# Patient Record
Sex: FEMALE | Race: WHITE | NOT HISPANIC OR LATINO | Employment: UNEMPLOYED | URBAN - METROPOLITAN AREA
[De-identification: names, ages, dates, MRNs, and addresses within clinical notes are randomized per-mention and may not be internally consistent; named-entity substitution may affect disease eponyms.]

---

## 2017-02-14 ENCOUNTER — HOSPITAL ENCOUNTER (OUTPATIENT)
Dept: RADIOLOGY | Facility: MEDICAL CENTER | Age: 42
End: 2017-02-14

## 2019-12-10 ENCOUNTER — OFFICE VISIT (OUTPATIENT)
Dept: NEUROLOGY | Facility: MEDICAL CENTER | Age: 44
End: 2019-12-10
Payer: MEDICAID

## 2019-12-10 VITALS
BODY MASS INDEX: 29.16 KG/M2 | TEMPERATURE: 97.9 F | OXYGEN SATURATION: 98 % | DIASTOLIC BLOOD PRESSURE: 70 MMHG | WEIGHT: 175 LBS | SYSTOLIC BLOOD PRESSURE: 132 MMHG | HEIGHT: 65 IN | HEART RATE: 62 BPM

## 2019-12-10 DIAGNOSIS — R51.9 WORSENING HEADACHES: ICD-10-CM

## 2019-12-10 DIAGNOSIS — G43.019 INTRACTABLE MIGRAINE WITHOUT AURA AND WITHOUT STATUS MIGRAINOSUS: ICD-10-CM

## 2019-12-10 PROCEDURE — 99205 OFFICE O/P NEW HI 60 MIN: CPT

## 2019-12-10 RX ORDER — VALPROIC ACID 250 MG/1
250 CAPSULE, LIQUID FILLED ORAL DAILY
COMMUNITY
End: 2020-07-29

## 2019-12-10 RX ORDER — FLUTICASONE PROPIONATE 50 MCG
SPRAY, SUSPENSION (ML) NASAL
Refills: 0 | COMMUNITY
Start: 2019-12-01 | End: 2020-07-29 | Stop reason: SDUPTHER

## 2019-12-10 RX ORDER — NORTRIPTYLINE HYDROCHLORIDE 25 MG/1
25 CAPSULE ORAL EVERY EVENING
Qty: 90 CAP | Refills: 3 | Status: SHIPPED | OUTPATIENT
Start: 2019-12-10 | End: 2020-05-26

## 2019-12-10 RX ORDER — MONTELUKAST SODIUM 10 MG/1
TABLET ORAL
Refills: 10 | COMMUNITY
Start: 2019-11-06 | End: 2020-07-29 | Stop reason: SDUPTHER

## 2019-12-10 RX ORDER — OXYBUTYNIN CHLORIDE 10 MG/1
TABLET, EXTENDED RELEASE ORAL
Refills: 1 | COMMUNITY
Start: 2019-11-06 | End: 2020-07-29 | Stop reason: SDUPTHER

## 2019-12-10 RX ORDER — RIZATRIPTAN BENZOATE 5 MG/1
1 TABLET, ORALLY DISINTEGRATING ORAL
Qty: 10 TAB | Refills: 3 | Status: SHIPPED | OUTPATIENT
Start: 2019-12-10 | End: 2020-07-29 | Stop reason: SDUPTHER

## 2019-12-10 RX ORDER — POLYETHYLENE GLYCOL 3350 17 G/17G
POWDER, FOR SOLUTION ORAL
Refills: 11 | COMMUNITY
Start: 2019-11-23 | End: 2020-07-29 | Stop reason: SDUPTHER

## 2019-12-10 RX ORDER — METHYLPREDNISOLONE 4 MG/1
4 TABLET ORAL DAILY
Qty: 21 TAB | Refills: 0 | Status: SHIPPED | OUTPATIENT
Start: 2019-12-10 | End: 2020-07-29

## 2019-12-10 RX ORDER — RIZATRIPTAN BENZOATE 5 MG/1
1 TABLET, ORALLY DISINTEGRATING ORAL
Qty: 10 TAB | Refills: 3 | Status: SHIPPED | OUTPATIENT
Start: 2019-12-10 | End: 2019-12-10

## 2019-12-10 RX ORDER — NORTRIPTYLINE HYDROCHLORIDE 25 MG/1
25 CAPSULE ORAL EVERY EVENING
Qty: 90 CAP | Refills: 3 | Status: SHIPPED | OUTPATIENT
Start: 2019-12-10 | End: 2019-12-10

## 2019-12-10 RX ORDER — BACLOFEN 20 MG
500 TABLET ORAL DAILY
Qty: 90 TAB | Refills: 3 | Status: SHIPPED | OUTPATIENT
Start: 2019-12-10 | End: 2020-07-29 | Stop reason: SDUPTHER

## 2019-12-10 RX ORDER — FERROUS SULFATE 325(65) MG
325 TABLET ORAL DAILY
Refills: 3 | COMMUNITY
Start: 2019-10-17 | End: 2020-07-29 | Stop reason: SDUPTHER

## 2019-12-10 ASSESSMENT — ENCOUNTER SYMPTOMS
INSOMNIA: 1
HEADACHES: 1
PHOTOPHOBIA: 1
RESPIRATORY NEGATIVE: 1
NECK PAIN: 1
GASTROINTESTINAL NEGATIVE: 1
CARDIOVASCULAR NEGATIVE: 1

## 2019-12-10 NOTE — PROGRESS NOTES
CC Migraines    HPI  45 yo female with longstanding migraines presents with worsening of her headaches since the move to [place] from [facility] in 2000. Took a medication but forgot what she was taking but this had helped her back then. Photophobia, squeezng and pressure and throbbing quality in both temples radiating back and behind her eyes.She has extreme phonophobia when this happens and she also gets nausea occasionally but no vomiting.  Dark rook and cool temperatures do help her headaches.  CT head in 2013 was reported normal.  Her PCP placed her on  mg bid and this is not working.  In the past month she had headache all day every day, she wakes up with them sometimes they wake her up in the middle of the night. Noise provokes the headaches.  She has headaches all day every day. She takes Naproxen 500 mg three times per week and that helps a little bit.  Intensity is 7/10 and it does not get any lower.  She has been on Depakote 1-1.5 year ago.  She does not sleep well and has trouble falling asleep.  She does not work and is on disability because of her migraines and also venous status ulcers.  She is not diabetic.  Family history of migraines in her mom.    Review of Systems   Constitutional: Positive for malaise/fatigue.   HENT: Negative.    Eyes: Positive for photophobia.   Respiratory: Negative.    Cardiovascular: Negative.    Gastrointestinal: Negative.    Genitourinary: Negative.    Musculoskeletal: Positive for neck pain.   Skin: Negative.    Neurological: Positive for headaches.   Psychiatric/Behavioral: The patient has insomnia.      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 at SURGERY [facility]   • EXPLORATORY LAPAROTOMY  6/20/2013    Performed by Klever Estrada M.D. at SURGERY [facility]   • CHOLECYSTECTOMY       Family History   Problem Relation Age of Onset   • Hypertension Mother    • Anemia Mother    • Diabetes Mother    • Hypertension Father      Social History     Socioeconomic History   • Marital status:      Spouse name: Not on file   • Number of children: Not on file   • Years of education: Not on file   • Highest education level: Not on file   Occupational History   • Not on file   Social Needs   • Financial resource strain: Not on file   • Food insecurity:     Worry: Not on file     Inability: Not on file   • Transportation needs:     Medical: Not on file     Non-medical: Not on file   Tobacco Use   • Smoking status: Never Smoker   • Smokeless tobacco: Never Used   Substance and Sexual Activity   • Alcohol use: Yes     Alcohol/week: 2.5 oz     Types: 5 Cans of beer per week     Comment: 2-3 beers couple times a week   • Drug use: No     Comment: denies h/o heroin, cocaine, meth, IVDU   • Sexual activity: Yes     Partners: Male     Comment:    Lifestyle   • Physical activity:     Days per week: Not on file     Minutes per session: Not on file   • Stress: Not on file   Relationships   • Social connections:     Talks on phone: Not on file     Gets together: Not on file     Attends Taoism service: Not on file     Active member of club or organization: Not on file     Attends meetings of clubs or organizations: Not on file     Relationship status: Not on file   • Intimate partner violence:     Fear of current or ex partner: Not on file     Emotionally abused: Not on file     Physically abused: Not on file     Forced sexual activity: Not on file   Other Topics Concern   • Not on file   Social History Narrative    She lives at home with her , no children     Allergies   Allergen Reactions   • Asa [Aspirin] Anaphylaxis, Diarrhea  "and Vomiting   • Codeine Anaphylaxis, Diarrhea and Vomiting   • Pcn [Penicillins] Anaphylaxis, Diarrhea and Vomiting   • Vicodin [Hydrocodone-Acetaminophen]        Encounter Vitals  Standard Vitals  Vitals  Blood Pressure: 132/70  Temperature: 36.6 °C (97.9 °F)  Pulse: 62  Pulse Oximetry: 98 %  Height: 165.1 cm (5' 5\")  Weight: 79.4 kg (175 lb)  Encounter Vitals  Temperature: 36.6 °C (97.9 °F)  Blood Pressure: 132/70  Pulse: 62  Pulse Oximetry: 98 %  Weight: 79.4 kg (175 lb)  Height: 165.1 cm (5' 5\")  BMI (Calculated): 29.12  Physical exam   Constitutional: Well-developed, well-nourished, good hygiene. Appears stated age.  Cardiovascular: RRR, with no murmurs, rubs or gallops. No carotid bruits.   Respiratory: Lungs CTA B/L, no W/R/R.   Abdomen: Soft Non-tender to Palpation. Non-distended.  Extremities: No peripheral edema, pedal pulses intact.   Skin: Warm, dry, intact. No rashes observed.  Eyes: Sclera anicteric   Funduscopic: Optic discs flat with no evidence of papilledema or pallor.   Neurologic:   Mental Status: Awake, alert, oriented x 3.   Speech: Fluent with normal prosody.   Memory: Able to recall recent and remote events accurately.    Concentration: Attentive. Able to focus on history and follow multi-step commands.              Fund of Knowledge: Appropriate   Cranial Nerves:    CN II: PERRL. No afferent pupillary defect.    CN III, IV, VI: Good eye closure, EOMI.     CN V: Facial sensation intact and symmetric.     CN VII: No facial asymmetry.     CN VIII: Hearing intact.     CN IX and X: Palate elevates symmetrically. Normal gag reflex.    CN XI: Symmetric shoulder shrug.     CN XII: Tongue midline.    Sensory: Intact light touch, vibration and temperature.    Coordination: No evidence of past-pointing on finger to nose testing, no dysdiadochokinesia. Heel to shin intact.             DTR's: 2+ throughout without clonus.    Babinski: Toes downgoing bilaterally.   Romberg: Negative.   Movements: No " tremors observed.   Musculoskeletal:    Strength: 5/5 in upper and lower extremities bilaterally.   Gait: Steady, narrow based.    Tone: Normal bulk and tone.   Joints: No swelling.     Current Outpatient Medications on File Prior to Visit   Medication Sig Dispense Refill   • valproic acid (DEPAKENE) 250 MG Cap Take 250 mg by mouth 2 Times a Day. Indications: Migraine Headache     • FEROSUL 325 (65 Fe) MG tablet Take 325 mg by mouth every day.  3   • fluticasone (FLONASE) 50 MCG/ACT nasal spray INSTILL ONE SPRAY IN EACH NOSTRIL BID  0   • montelukast (SINGULAIR) 10 MG Tab TK 1 T PO QAM  10   • oxybutynin SR (DITROPAN-XL) 10 MG CR tablet TK 1 T PO QD  1   • polyethylene glycol/lytes (MIRALAX) Pack   11   • Cetirizine HCl 10 MG Cap Take  by mouth.     • albuterol (VENTOLIN OR PROVENTIL) 108 (90 BASE) MCG/ACT AERS Inhale 2-4 Puffs by mouth every 8 hours as needed. For SOB.       No current facility-administered medications on file prior to visit.      Lab Results   Component Value Date/Time    SODIUM 137 02/15/2014 11:00 AM    POTASSIUM 4.3 02/15/2014 11:00 AM    CHLORIDE 106 02/15/2014 11:00 AM    CO2 24 02/15/2014 11:00 AM    GLUCOSE 98 02/15/2014 11:00 AM    BUN 9 02/15/2014 11:00 AM    CREATININE 0.74 02/15/2014 11:00 AM      Lab Results   Component Value Date/Time    WBC 8.2 02/15/2014 11:00 AM    RBC 4.43 02/15/2014 11:00 AM    HEMOGLOBIN 13.5 02/15/2014 11:00 AM    HEMATOCRIT 40.8 02/15/2014 11:00 AM    MCV 92.1 02/15/2014 11:00 AM    MCH 30.5 02/15/2014 11:00 AM    MCHC 33.1 02/15/2014 11:00 AM    MPV 9.5 02/15/2014 11:00 AM    NEUTSPOLYS 68.4 02/15/2014 11:00 AM    LYMPHOCYTES 18.0 (L) 02/15/2014 11:00 AM    MONOCYTES 6.4 02/15/2014 11:00 AM    EOSINOPHILS 6.4 (H) 02/15/2014 11:00 AM    BASOPHILS 0.8 02/15/2014 11:00 AM    HYPOCHROMIA 1+ 07/07/2013 02:32 PM      Current Outpatient Medications on File Prior to Visit   Medication Sig Dispense Refill   • valproic acid (DEPAKENE) 250 MG Cap Take 250 mg by mouth 2  Times a Day. Indications: Migraine Headache     • FEROSUL 325 (65 Fe) MG tablet Take 325 mg by mouth every day.  3   • fluticasone (FLONASE) 50 MCG/ACT nasal spray INSTILL ONE SPRAY IN EACH NOSTRIL BID  0   • montelukast (SINGULAIR) 10 MG Tab TK 1 T PO QAM  10   • oxybutynin SR (DITROPAN-XL) 10 MG CR tablet TK 1 T PO QD  1   • polyethylene glycol/lytes (MIRALAX) Pack   11   • Cetirizine HCl 10 MG Cap Take  by mouth.     • albuterol (VENTOLIN OR PROVENTIL) 108 (90 BASE) MCG/ACT AERS Inhale 2-4 Puffs by mouth every 8 hours as needed. For SOB.       No current facility-administered medications on file prior to visit.      Imaging   CT head from 2013   NAF    No imaging since     Impression and plan   Migraine headaches without aura   Pamelor 25 mg nightly x 7 days then increase to 50 mg nightly   Stop VPA- decrease to 250 mg daily for 5-6 days then stop   Will give Maxalt 5 mg desintegrating tabs for migraine prn up to 10 tabs per month and no more than 2 in 24h  Mag oxide 500 mg daily  B2 400 mg daily  Coenzyme Q10 300 mg daily   MRI brain with and without contrast    ifestyle changes that will help to reduce headache frequency and intensity    Diet: Eat at regular times each day. Eat protein throughout the day. It is especially important to start the day with protein.  Sleep: Keep regular hours for going to bed at night and getting up in the morning. Do not vary the hours on the weekend. Do not watch TV and read in bed. If you are unable to sleep, get up and read something boring until you feel sleepy, then go back to bed. If you are not able to sleep within 20 minutes, get up again. Even if you do not sleep well, get out of bed at the same time in the morning. Do not take naps. This will help you to regulate your sleep, and improve your sleep quality.   Exercise: The goal is to exercise at the same time every day for at least 20 minutes at an intensity that increases your heart rate.    3. Headache diary- fatuma on a  calendar the days you have a headache, severity of headache, medication taken, and how long headache lasted    RTC 3-4 months     Caren Burr MD PhD

## 2020-01-13 ENCOUNTER — APPOINTMENT (OUTPATIENT)
Dept: RADIOLOGY | Facility: MEDICAL CENTER | Age: 45
End: 2020-01-13
Payer: MEDICAID

## 2020-01-13 DIAGNOSIS — R51.9 WORSENING HEADACHES: ICD-10-CM

## 2020-01-13 DIAGNOSIS — G43.019 INTRACTABLE MIGRAINE WITHOUT AURA AND WITHOUT STATUS MIGRAINOSUS: ICD-10-CM

## 2020-01-13 PROCEDURE — 70553 MRI BRAIN STEM W/O & W/DYE: CPT

## 2020-01-13 PROCEDURE — A9576 INJ PROHANCE MULTIPACK: HCPCS | Performed by: INTERNAL MEDICINE

## 2020-01-13 PROCEDURE — 700117 HCHG RX CONTRAST REV CODE 255: Performed by: INTERNAL MEDICINE

## 2020-01-13 RX ADMIN — GADOTERIDOL 15 ML: 279.3 INJECTION, SOLUTION INTRAVENOUS at 14:39

## 2020-05-26 RX ORDER — NORTRIPTYLINE HYDROCHLORIDE 25 MG/1
CAPSULE ORAL
Qty: 90 CAP | Refills: 3 | Status: SHIPPED | OUTPATIENT
Start: 2020-05-26 | End: 2020-12-10 | Stop reason: SDUPTHER

## 2020-07-29 ENCOUNTER — OFFICE VISIT (OUTPATIENT)
Dept: MEDICAL GROUP | Facility: CLINIC | Age: 45
End: 2020-07-29
Payer: MEDICAID

## 2020-07-29 VITALS
HEART RATE: 93 BPM | WEIGHT: 168 LBS | TEMPERATURE: 99.8 F | HEIGHT: 65 IN | BODY MASS INDEX: 27.99 KG/M2 | SYSTOLIC BLOOD PRESSURE: 112 MMHG | OXYGEN SATURATION: 98 % | RESPIRATION RATE: 16 BRPM | DIASTOLIC BLOOD PRESSURE: 72 MMHG

## 2020-07-29 DIAGNOSIS — L98.9 LESION OF FACE: ICD-10-CM

## 2020-07-29 DIAGNOSIS — N30.90 CYSTITIS: ICD-10-CM

## 2020-07-29 DIAGNOSIS — E87.6 HYPOKALEMIA: ICD-10-CM

## 2020-07-29 DIAGNOSIS — K59.01 SLOW TRANSIT CONSTIPATION: ICD-10-CM

## 2020-07-29 DIAGNOSIS — D23.30 CYST, DERMOID, FACE: ICD-10-CM

## 2020-07-29 DIAGNOSIS — J45.40 MODERATE PERSISTENT ASTHMA WITHOUT COMPLICATION: ICD-10-CM

## 2020-07-29 DIAGNOSIS — J30.2 SEASONAL ALLERGIES: ICD-10-CM

## 2020-07-29 DIAGNOSIS — E55.9 VITAMIN D DEFICIENCY: ICD-10-CM

## 2020-07-29 DIAGNOSIS — R32 URINARY INCONTINENCE, UNSPECIFIED TYPE: ICD-10-CM

## 2020-07-29 DIAGNOSIS — G25.81 RESTLESS LEG: ICD-10-CM

## 2020-07-29 DIAGNOSIS — G43.119 INTRACTABLE MIGRAINE WITH AURA WITHOUT STATUS MIGRAINOSUS: ICD-10-CM

## 2020-07-29 PROCEDURE — 99204 OFFICE O/P NEW MOD 45 MIN: CPT | Performed by: PHYSICIAN ASSISTANT

## 2020-07-29 RX ORDER — MOMETASONE FUROATE AND FORMOTEROL FUMARATE DIHYDRATE 100; 5 UG/1; UG/1
2 AEROSOL RESPIRATORY (INHALATION) 2 TIMES DAILY
Qty: 3 G | Refills: 3 | Status: SHIPPED | OUTPATIENT
Start: 2020-07-29

## 2020-07-29 RX ORDER — FEXOFENADINE HCL 180 MG/1
180 TABLET ORAL DAILY
Qty: 90 TAB | Refills: 1 | Status: SHIPPED | OUTPATIENT
Start: 2020-07-29

## 2020-07-29 RX ORDER — MONTELUKAST SODIUM 10 MG/1
10 TABLET ORAL DAILY
Qty: 90 TAB | Refills: 2 | Status: SHIPPED | OUTPATIENT
Start: 2020-07-29

## 2020-07-29 RX ORDER — BACLOFEN 20 MG
500 TABLET ORAL DAILY
Qty: 90 TAB | Refills: 3 | Status: SHIPPED | OUTPATIENT
Start: 2020-07-29

## 2020-07-29 RX ORDER — POTASSIUM CHLORIDE 750 MG/1
TABLET, EXTENDED RELEASE ORAL
COMMUNITY
Start: 2020-07-14 | End: 2020-07-29 | Stop reason: SDUPTHER

## 2020-07-29 RX ORDER — NAPROXEN 500 MG/1
500 TABLET ORAL 2 TIMES DAILY WITH MEALS
Qty: 180 TAB | Refills: 2 | Status: SHIPPED | OUTPATIENT
Start: 2020-07-29 | End: 2020-12-14 | Stop reason: SDUPTHER

## 2020-07-29 RX ORDER — OXYBUTYNIN CHLORIDE 10 MG/1
10 TABLET, EXTENDED RELEASE ORAL DAILY
Qty: 90 TAB | Refills: 2 | Status: SHIPPED | OUTPATIENT
Start: 2020-07-29 | End: 2020-12-14 | Stop reason: SDUPTHER

## 2020-07-29 RX ORDER — FLUTICASONE PROPIONATE 50 MCG
2 SPRAY, SUSPENSION (ML) NASAL DAILY
Qty: 16 G | Refills: 6 | Status: SHIPPED | OUTPATIENT
Start: 2020-07-29 | End: 2020-12-14 | Stop reason: SDUPTHER

## 2020-07-29 RX ORDER — POTASSIUM CHLORIDE 1.5 G/1.58G
20 POWDER, FOR SOLUTION ORAL 2 TIMES DAILY
COMMUNITY
End: 2020-07-29

## 2020-07-29 RX ORDER — POTASSIUM CHLORIDE 750 MG/1
10 TABLET, EXTENDED RELEASE ORAL DAILY
Qty: 90 TAB | Refills: 2 | Status: SHIPPED | OUTPATIENT
Start: 2020-07-29 | End: 2020-12-14 | Stop reason: SDUPTHER

## 2020-07-29 RX ORDER — ALBUTEROL SULFATE 90 UG/1
2-4 AEROSOL, METERED RESPIRATORY (INHALATION) EVERY 8 HOURS PRN
Qty: 8.5 G | Refills: 3 | Status: SHIPPED | OUTPATIENT
Start: 2020-07-29 | End: 2020-12-14 | Stop reason: SDUPTHER

## 2020-07-29 RX ORDER — RIZATRIPTAN BENZOATE 5 MG/1
1 TABLET, ORALLY DISINTEGRATING ORAL
Qty: 10 TAB | Refills: 3 | Status: SHIPPED | OUTPATIENT
Start: 2020-07-29 | End: 2020-12-14 | Stop reason: SDUPTHER

## 2020-07-29 RX ORDER — POLYETHYLENE GLYCOL 3350 17 G/17G
17 POWDER, FOR SOLUTION ORAL DAILY
Qty: 90 EACH | Refills: 3 | Status: SHIPPED | OUTPATIENT
Start: 2020-07-29

## 2020-07-29 RX ORDER — FERROUS SULFATE 325(65) MG
325 TABLET ORAL DAILY
Qty: 90 TAB | Refills: 3 | Status: SHIPPED | OUTPATIENT
Start: 2020-07-29

## 2020-07-29 RX ORDER — ROPINIROLE 0.25 MG/1
0.25 TABLET, FILM COATED ORAL DAILY
Qty: 30 TAB | Refills: 1 | Status: SHIPPED | OUTPATIENT
Start: 2020-07-29 | End: 2020-09-03

## 2020-07-29 ASSESSMENT — PATIENT HEALTH QUESTIONNAIRE - PHQ9: CLINICAL INTERPRETATION OF PHQ2 SCORE: 0

## 2020-07-29 NOTE — LETTER
[facility]  Adamaris Hamilton P.A.-C.  [address] [address] [address]  [facility] [address]  Fax: [phone]   Authorization for Release/Disclosure of   Protected Health Information   Name: VIRGEN SANFORD : 1975 SSN: [id number]   Address: [address] [place] [address]  [facility] [address] Phone:    [phone] (home)    I authorize the entity listed below to release/disclose the PHI below to:   Counts include 234 beds at the [facility]/Adamaris Hamilton P.A.-C. and Adamaris Hamilton P.A.-C.   Provider or Entity Name:   [facility]     Address   City, State, Zip   Phone:      Fax:     Reason for request: continuity of care   Information to be released:    [  ] LAST COLONOSCOPY,  including any PATH REPORT and follow-up  [  ] LAST FIT/COLOGUARD RESULT [  ] LAST DEXA  [  ] LAST MAMMOGRAM  [  ] LAST PAP  [  ] LAST LABS [  ] RETINA EXAM REPORT  [  ] IMMUNIZATION RECORDS  [  x] Release all info      [  ] Check here and initial the line next to each item to release ALL health information INCLUDING  _____ Care and treatment for drug and / or alcohol abuse  _____ HIV testing, infection status, or AIDS  _____ Genetic Testing    DATES OF SERVICE OR TIME PERIOD TO BE DISCLOSED: _____________  I understand and acknowledge that:  * This Authorization may be revoked at any time by you in writing, except if your health information has already been used or disclosed.  * Your health information that will be used or disclosed as a result of you signing this authorization could be re-disclosed by the recipient. If this occurs, your re-disclosed health information may no longer be protected by State or Federal laws.  * You may refuse to sign this Authorization. Your refusal will not affect your ability to obtain treatment.  * This Authorization becomes effective upon signing and will  on (date) __________.      If no date is indicated, this Authorization will  one (1) year from the signature date.    Name: Virgen Sanford    Signature:   Date:          7/29/2020       PLEASE FAX REQUESTED RECORDS BACK TO: [phone]

## 2020-07-29 NOTE — PROGRESS NOTES
cc:  Establish care    Subjective:     Virgen Mckinnon is a 45 y.o. female presenting for establish care      Patient presents to the office for establish care.  Patient states that her doctor left the area.  She lives in [place].      Patient states that she has a growth on the left corner of her mouth and states that a hair will grow and she will pull the hair out.  She states that the hair will grow back and when she pulls it out it will shrink down.  It has been a problem for years.  It is painful when it grows.  It is a problem on a monthly basis.     Patient states that she is having muscle twitching in both legs that started when she stopped tramadol.  She states that it has been going on for a year.  It happens at night.  It happens when she is sleeping.  It usually does not improve without medication. She has been taking naproxen for this.  Patient states she cannot tolerate aspirin greater than 81 mg and so it is listed in chart as asa allergy.  She states that she has been taking naproxen for about a year.  She states that she was taking tramadol for a lengthy period of time for pain.  However when she realized she was addicted to it, she stopped it completely.  When she stopped it, she noticed that the muscle twitching was occurring and the only thing that would help was the naproxen.  She is wanting to know if there is something that she can do differently other than the naproxen to help her symptoms.    Patient does have a history of asthma.  She does have a pulmonologist that she sees but is currently in need of all of her medication refills.  She is requesting that we fill at this time.  Along with asthma she also has seasonal allergies and indicates that the Zyrtec is no longer working for her.  She is also been on Claritin in the past.  She would like to know if there is another antihistamine that she can take.    Patient reports a history of migraines and indicates that it is been an extended  period of time where she has been able to meet with neurology.  At this time she is requesting a refill of her Maxalt.  However, she is also requesting a renewed referral to neurology.    Patient indicates that she has been having bladder pain for years.  She states that she has been to multiple gynecologist and they have not been able to help her with her symptoms.  She is requesting a refill of her oxybutynin but requests further information as to what may be causing her symptoms.    Patient has a history of constipation.  She is requesting a refill of her MiraLAX.    Patient indicates that she was recently seen at [facility].  She had been feeling unwell and went to see her 's cardiologist.  Further testing was done and showed patient's potassium was 8.  She was then sent to the hospital to have this treated.  She is requesting that we refill her potassium at this time.    Patient does have a vitamin D deficiency.  She is also due for routine lab work and is requesting at this time.    Review of systems:  See above.   Denies any symptoms unless previously indicated.        Current Outpatient Medications:   •  fluticasone (FLONASE) 50 MCG/ACT nasal spray, Spray 2 Sprays in nose every day., Disp: 16 g, Rfl: 6  •  FEROSUL 325 (65 Fe) MG tablet, Take 1 Tab by mouth every day., Disp: 90 Tab, Rfl: 3  •  Magnesium Oxide 500 MG Tab, Take 1 Tab by mouth every day., Disp: 90 Tab, Rfl: 3  •  oxybutynin SR (DITROPAN-XL) 10 MG CR tablet, Take 1 Tab by mouth every day. Take 1 tablet by mouth every day, Disp: 90 Tab, Rfl: 2  •  albuterol 108 (90 Base) MCG/ACT Aero Soln inhalation aerosol, Inhale 2-4 Puffs by mouth every 8 hours as needed. For SOB., Disp: 8.5 g, Rfl: 3  •  fexofenadine (ALLEGRA) 180 MG tablet, Take 1 Tab by mouth every day., Disp: 90 Tab, Rfl: 1  •  montelukast (SINGULAIR) 10 MG Tab, Take 1 Tab by mouth every day. Take 1 tablet by mouth every morning, Disp: 90 Tab, Rfl: 2  •  Mometasone  "Furo-Formoterol Fum (DULERA) 100-5 MCG/ACT Aerosol, Inhale 2 Puffs by mouth 2 Times a Day., Disp: 3 g, Rfl: 3  •  Rizatriptan Benzoate (MAXALT-MLT) 5 MG TABLET DISPERSIBLE, Take 1 Tab by mouth 3 times a week., Disp: 10 Tab, Rfl: 3  •  aspirin EC (ECOTRIN) 81 MG Tablet Delayed Response, Take 1 Tab by mouth every day., Disp: 90 Tab, Rfl: 3  •  naproxen (NAPROSYN) 500 MG Tab, Take 1 Tab by mouth 2 times a day, with meals., Disp: 180 Tab, Rfl: 2  •  Riboflavin 400 MG Cap, Take 1 Cap by mouth every day., Disp: 90 Cap, Rfl: 3  •  ROPINIRole (REQUIP) 0.25 MG Tab, Take 1 Tab by mouth every day., Disp: 30 Tab, Rfl: 1  •  polyethylene glycol/lytes (MIRALAX) 17 g Pack, Take 1 Packet by mouth every day., Disp: 90 Each, Rfl: 3  •  nortriptyline (PAMELOR) 25 MG Cap, TAKE 1 CAPSULE BY MOUTH BEFORE AT BEDTIME FOR 7 DAYS THEN INCREASE TO 2 CAPSULES BY MOUTH AT BEDTIME, Disp: 90 Cap, Rfl: 3  •  Coenzyme Q10 300 MG Cap, Take 1 Cap by mouth every day., Disp: 90 Cap, Rfl: 3  •  potassium chloride SA (K-DUR) 10 MEQ Tab CR, TK 1 T PO  QD FOR 30 DAYS, Disp: , Rfl:     Allergies, past medical history, past surgical history, family history, social history reviewed and updated    Objective:     Vitals: /72 (BP Location: Left arm, Patient Position: Sitting, BP Cuff Size: Adult)   Pulse 93   Temp 37.7 °C (99.8 °F) (Temporal)   Resp 16   Ht 1.651 m (5' 5\")   Wt 76.2 kg (168 lb)   SpO2 98%   BMI 27.96 kg/m²   General: Alert, pleasant, NAD  EYES:   PERRL, EOMI, no icterus or pallor.  Conjunctivae and lids normal.   HENT:  Normocephalic.  External ears normal.  There appears to be a cystic lesion at the corner of lower left lip.  It appears to be cystic in nature and swollen.  Neck supple.    Abdomen: Not obese   skin: Warm, dry, no rashes.  Musculoskeletal: Gait is normal.  Moves all extremities well.    Extremities: Normal range of motion all extremities.   Neurological: No tremors, sensation grossly intact,  CN2-12 intact.  Psych:  " Affect/mood is normal, judgement is good, memory is intact, grooming is appropriate.    Assessment/Plan:     Virgen was seen today for establish care and nevus.    Diagnoses and all orders for this visit:    Cyst, dermoid, face  -     REFERRAL TO DERMATOLOGY  Lesion of face  -     REFERRAL TO DERMATOLOGY    I do believe this needs to be evaluated further and I am submitting a referral to dermatology so this particular issue can be evaluated further.    Restless leg  -     naproxen (NAPROSYN) 500 MG Tab; Take 1 Tab by mouth 2 times a day, with meals.  -     ROPINIRole (REQUIP) 0.25 MG Tab; Take 1 Tab by mouth every day.      I believe tramadol is actually masking patient's symptoms and then when she stopped the medication, the symptoms became more prominent.  We will start her on Requip to see if this will help her symptoms.  I will refill her naproxen with the goal of eventually weaning off this medication if the Requip is helpful.  The plan is to follow-up in 2 to 4 weeks, sooner if needed.    Moderate persistent asthma without complication  -     Lipid Profile; Future  -     Comp Metabolic Panel; Future  -     TSH; Future  -     FREE THYROXINE; Future  -     FEROSUL 325 (65 Fe) MG tablet; Take 1 Tab by mouth every day.  -     Magnesium Oxide 500 MG Tab; Take 1 Tab by mouth every day.  -     albuterol 108 (90 Base) MCG/ACT Aero Soln inhalation aerosol; Inhale 2-4 Puffs by mouth every 8 hours as needed. For SOB.  -     Mometasone Furo-Formoterol Fum (DULERA) 100-5 MCG/ACT Aerosol; Inhale 2 Puffs by mouth 2 Times a Day.        -     aspirin EC (ECOTRIN) 81 MG Tablet Delayed Response; Take 1 Tab by mouth every day.  -     Riboflavin 400 MG Cap; Take 1 Cap by mouth every day.    Medications refilled at this time.  Labs ordered.  Patient indicates that she will follow-up with her pulmonologist..    Seasonal allergies  -     fluticasone (FLONASE) 50 MCG/ACT nasal spray; Spray 2 Sprays in nose every day.  -      fexofenadine (ALLEGRA) 180 MG tablet; Take 1 Tab by mouth every day.  -     montelukast (SINGULAIR) 10 MG Tab; Take 1 Tab by mouth every day. Take 1 tablet by mouth every morning    We will refill Singulair and Flonase and switch to Zyrtec to Allegra.  She may need to follow-up in 3 months.  It is recommended to switch to antihistamine every 3 months for maximum effect.    Intractable migraine with aura without status migrainosus  -     REFERRAL TO NEUROLOGY        -     Rizatriptan Benzoate (MAXALT-MLT) 5 MG TABLET DISPERSIBLE; Take 1 Tab by mouth 3 times a week.    Referral submitted to neurology.  I will refill patient's migraine medication at this time.    Urinary incontinence, unspecified type  -     oxybutynin SR (DITROPAN-XL) 10 MG CR tablet; Take 1 Tab by mouth every day. Take 1 tablet by mouth every day  Cystitis  -     oxybutynin SR (DITROPAN-XL) 10 MG CR tablet; Take 1 Tab by mouth every day. Take 1 tablet by mouth every day    I believe patient may be experiencing chronic cystitis.  We will refill her medication at this time but I am having her evaluate her diet further.  She can find useful information on the Internet I do believe for this issue.  We can discuss this further when she returns for follow-up.    Slow transit constipation  -     polyethylene glycol/lytes (MIRALAX) 17 g Pack; Take 1 Packet by mouth every day.    Medication refilled.    Hypokalemia    Prescription for potassium sent to pharmacy on file.    Vitamin D deficiency  -     VITAMIN D,25 HYDROXY; Future    Labs have been ordered to evaluate further.    This was a 50-minute appointment with greater than 50% spent in education counseling and coordination of care.        Return in about 4 weeks (around 8/26/2020), or if symptoms worsen or fail to improve, for 2-4 weeks.    Please note that this dictation was created using voice recognition software. I have made every reasonable attempt to correct obvious errors, but expect that there  are errors of grammar and possible content that I did not discover before finalizing note.

## 2020-08-26 RX ORDER — CETIRIZINE HYDROCHLORIDE 10 MG/1
10 TABLET ORAL DAILY
Qty: 30 TAB | Refills: 3 | Status: SHIPPED | OUTPATIENT
Start: 2020-08-26

## 2020-08-26 RX ORDER — CETIRIZINE HYDROCHLORIDE 10 MG/1
10 TABLET ORAL DAILY
COMMUNITY
End: 2020-08-26 | Stop reason: SDUPTHER

## 2020-08-26 NOTE — TELEPHONE ENCOUNTER
Patient coming from [place] - called to let them know we were a little behind, they too were a little behind. We have rescheduled until next week but they are requesting a refill on meds.    Was the patient seen in the last year in this department? 7/29/20    Does patient have an active prescription for medications requested? Yes    Received Request Via: Pharmacy    No visits with results within 1 Year(s) from this visit.   Latest known visit with results is:   Office Visit on 07/07/2015   Component Date Value   • POC Color 07/07/2015 dark yellow    • POC Appearance 07/07/2015 clear    • POC Leukocyte Esterase 07/07/2015 neg    • POC Nitrites 07/07/2015 neg    • POC Urobiligen 07/07/2015 neg    • POC Protein 07/07/2015 neg    • POC Urine PH 07/07/2015 5.0    • POC Blood 07/07/2015 neg    • POC Specific Gravity 07/07/2015 1.030    • POC Ketones 07/07/2015 15    • POC Bilirubin 07/07/2015 neg    • POC Glucose 07/07/2015 neg    • Rapid Strep Screen 07/07/2015 neg    • Internal Control Positive 07/07/2015 Positive    • Internal Control Negative 07/07/2015 Negative

## 2020-09-03 ENCOUNTER — OFFICE VISIT (OUTPATIENT)
Dept: MEDICAL GROUP | Facility: CLINIC | Age: 45
End: 2020-09-03
Payer: MEDICAID

## 2020-09-03 VITALS
BODY MASS INDEX: 27.63 KG/M2 | SYSTOLIC BLOOD PRESSURE: 110 MMHG | WEIGHT: 165.8 LBS | HEIGHT: 65 IN | DIASTOLIC BLOOD PRESSURE: 80 MMHG | TEMPERATURE: 98.8 F | HEART RATE: 96 BPM | RESPIRATION RATE: 14 BRPM | OXYGEN SATURATION: 98 %

## 2020-09-03 DIAGNOSIS — G43.119 INTRACTABLE MIGRAINE WITH AURA WITHOUT STATUS MIGRAINOSUS: ICD-10-CM

## 2020-09-03 DIAGNOSIS — Z12.31 ENCOUNTER FOR SCREENING MAMMOGRAM FOR BREAST CANCER: ICD-10-CM

## 2020-09-03 DIAGNOSIS — R73.9 HYPERGLYCEMIA: ICD-10-CM

## 2020-09-03 DIAGNOSIS — N30.90 CYSTITIS: ICD-10-CM

## 2020-09-03 DIAGNOSIS — R31.9 PAINLESS HEMATURIA: ICD-10-CM

## 2020-09-03 DIAGNOSIS — G25.81 RESTLESS LEG: ICD-10-CM

## 2020-09-03 DIAGNOSIS — R32 URINARY INCONTINENCE, UNSPECIFIED TYPE: ICD-10-CM

## 2020-09-03 DIAGNOSIS — E55.9 VITAMIN D DEFICIENCY: ICD-10-CM

## 2020-09-03 PROCEDURE — 99214 OFFICE O/P EST MOD 30 MIN: CPT | Performed by: PHYSICIAN ASSISTANT

## 2020-09-03 RX ORDER — ROPINIROLE 0.5 MG/1
0.5 TABLET, FILM COATED ORAL DAILY
Qty: 30 TAB | Refills: 6 | Status: SHIPPED | OUTPATIENT
Start: 2020-09-03 | End: 2020-12-14 | Stop reason: SDUPTHER

## 2020-09-03 RX ORDER — RIBOFLAVIN (VITAMIN B2) 400 MG
1 TABLET ORAL DAILY
COMMUNITY
Start: 2020-07-29

## 2020-09-03 NOTE — PROGRESS NOTES
cc:  Follow up    Subjective:     Virgen Mckinnon is a 45 y.o. female presenting for follow up      Patient presents to the office for follow up.  Patient states that she is doing better on the restless leg medication.  She states that she has to take naproxen 3 times a week to help with the symptoms.  She would like to increase the dose.      Patient states that she has migraines.  She has not set up an appointment with neurology.  She states that she will be doing this.  She is interested in [facility].    Patient states that when she had her hysterectomy, she had a cut into the ureter.  She feels that this may be related to her pain.  She states that she changed her diet.  She states that she has decreased her salt intake and drinks less juice.  She states that oxybutynin is not helping.      Review of systems:  See above.   Denies any symptoms unless previously indicated.        Current Outpatient Medications:   •  ROPINIRole (REQUIP) 0.5 MG Tab, Take 1 Tab by mouth every day., Disp: 30 Tab, Rfl: 6  •  cetirizine (ZYRTEC) 10 MG Tab, Take 1 Tab by mouth every day., Disp: 30 Tab, Rfl: 3  •  fluticasone (FLONASE) 50 MCG/ACT nasal spray, Spray 2 Sprays in nose every day., Disp: 16 g, Rfl: 6  •  FEROSUL 325 (65 Fe) MG tablet, Take 1 Tab by mouth every day., Disp: 90 Tab, Rfl: 3  •  Magnesium Oxide 500 MG Tab, Take 1 Tab by mouth every day., Disp: 90 Tab, Rfl: 3  •  oxybutynin SR (DITROPAN-XL) 10 MG CR tablet, Take 1 Tab by mouth every day. Take 1 tablet by mouth every day, Disp: 90 Tab, Rfl: 2  •  albuterol 108 (90 Base) MCG/ACT Aero Soln inhalation aerosol, Inhale 2-4 Puffs by mouth every 8 hours as needed. For SOB., Disp: 8.5 g, Rfl: 3  •  fexofenadine (ALLEGRA) 180 MG tablet, Take 1 Tab by mouth every day., Disp: 90 Tab, Rfl: 1  •  montelukast (SINGULAIR) 10 MG Tab, Take 1 Tab by mouth every day. Take 1 tablet by mouth every morning, Disp: 90 Tab, Rfl: 2  •  Mometasone Furo-Formoterol Fum (DULERA) 100-5 MCG/ACT  "Aerosol, Inhale 2 Puffs by mouth 2 Times a Day., Disp: 3 g, Rfl: 3  •  Rizatriptan Benzoate (MAXALT-MLT) 5 MG TABLET DISPERSIBLE, Take 1 Tab by mouth 3 times a week., Disp: 10 Tab, Rfl: 3  •  aspirin EC (ECOTRIN) 81 MG Tablet Delayed Response, Take 1 Tab by mouth every day., Disp: 90 Tab, Rfl: 3  •  naproxen (NAPROSYN) 500 MG Tab, Take 1 Tab by mouth 2 times a day, with meals., Disp: 180 Tab, Rfl: 2  •  Riboflavin 400 MG Cap, Take 1 Cap by mouth every day., Disp: 90 Cap, Rfl: 3  •  polyethylene glycol/lytes (MIRALAX) 17 g Pack, Take 1 Packet by mouth every day., Disp: 90 Each, Rfl: 3  •  potassium chloride SA (K-DUR) 10 MEQ Tab CR, Take 1 Tab by mouth every day., Disp: 90 Tab, Rfl: 2  •  nortriptyline (PAMELOR) 25 MG Cap, TAKE 1 CAPSULE BY MOUTH BEFORE AT BEDTIME FOR 7 DAYS THEN INCREASE TO 2 CAPSULES BY MOUTH AT BEDTIME, Disp: 90 Cap, Rfl: 3  •  Coenzyme Q10 300 MG Cap, Take 1 Cap by mouth every day., Disp: 90 Cap, Rfl: 3  •  Riboflavin 400 MG Tab, Take 1 tablet by mouth every day. Take 1 tablet by mouth every day, Disp: , Rfl:     Allergies, past medical history, past surgical history, family history, social history reviewed and updated    Objective:     Vitals: /80 (BP Location: Left arm, Patient Position: Sitting, BP Cuff Size: Adult)   Pulse 96   Temp 37.1 °C (98.8 °F) (Temporal)   Resp 14   Ht 1.651 m (5' 5\")   Wt 75.2 kg (165 lb 12.8 oz)   SpO2 98%   BMI 27.59 kg/m²   General: Alert, pleasant, NAD  EYES:   PERRL, EOMI, no icterus or pallor.  Conjunctivae and lids normal.   HENT:  Normocephalic.  External ears normal.   Neck supple.   Respiratory: Normal respiratory effort.    Abdomen: Not obese.  Skin: Warm, dry, no rashes.  Musculoskeletal: Gait is normal.  Moves all extremities well.    Extremities: normal range of motion all extremities.   Neurological: No tremors, sensation grossly intact, CN2-12 intact.  Psych:  Affect/mood is normal, judgement is good, memory is intact, grooming is " appropriate.    Assessment/Plan:     Virgen was seen today for leg pain.    Diagnoses and all orders for this visit:    Restless leg  -     ROPINIRole (REQUIP) 0.5 MG Tab; Take 1 Tab by mouth every day.    We will increase the Requip from 0.25 mg to 0.5 mg.  The plan will be to follow-up in the next 4 to 12 weeks for reevaluation.    Hyperglycemia  -     Lipid Profile; Future  -     Comp Metabolic Panel; Future  -     TSH; Future  -     FREE THYROXINE; Future  -     HEMOGLOBIN A1C; Future  -     REFERRAL TO UROLOGY  Vitamin D deficiency  -     VITAMIN D,25 HYDROXY; Future    When we received lab results from [place], only lab results submitted was a BMP.  Therefore we will reorder the labs that were ordered at the previous visit and patient will need to have labs repeated.    Urinary incontinence, unspecified type  -     URINALYSIS,CULTURE IF INDICATED; Future  -     REFERRAL TO UROLOGY  Cystitis  -     URINALYSIS,CULTURE IF INDICATED; Future  -     URINALYSIS,CULTURE IF INDICATED; Future  -     REFERRAL TO UROLOGY  Painless hematuria  -     CBC WITH DIFFERENTIAL; Future  -     REFERRAL TO UROLOGY    Symptoms are not controlled.  I am concerned she may have an interstitial cystitis.  Although I have discussed diet with her, I believe that further evaluation is needed and we will send to urology.    Migraines    Patient will make an appointment with neurology.    Encounter for screening mammogram for breast cancer  -     MA-SCREENING MAMMO BILAT W/CAD; Future    Mammogram screening ordered.    30 to 35-minute appointment with greater than 50% spent in education and counseling.    Return in about 4 weeks (around 10/1/2020), or if symptoms worsen or fail to improve, for 6-12 weeks.    Please note that this dictation was created using voice recognition software. I have made every reasonable attempt to correct obvious errors, but expect that there are errors of grammar and possible content that I did not discover  before finalizing note.

## 2020-09-30 ENCOUNTER — OFFICE VISIT (OUTPATIENT)
Dept: MEDICAL GROUP | Facility: CLINIC | Age: 45
End: 2020-09-30
Payer: MEDICAID

## 2020-09-30 VITALS
BODY MASS INDEX: 26.82 KG/M2 | HEIGHT: 65 IN | RESPIRATION RATE: 16 BRPM | HEART RATE: 96 BPM | TEMPERATURE: 100.2 F | OXYGEN SATURATION: 96 % | WEIGHT: 161 LBS | DIASTOLIC BLOOD PRESSURE: 80 MMHG | SYSTOLIC BLOOD PRESSURE: 132 MMHG

## 2020-09-30 DIAGNOSIS — I83.813 VARICOSE VEINS OF BOTH LOWER EXTREMITIES WITH PAIN: ICD-10-CM

## 2020-09-30 PROCEDURE — 99213 OFFICE O/P EST LOW 20 MIN: CPT | Performed by: PHYSICIAN ASSISTANT

## 2020-09-30 NOTE — PROGRESS NOTES
cc:  DETR paperwork    Subjective:     Virgen Mckinnon is a 45 y.o. female presenting for DETR paperwork      Patient presents to the office for JANELL paperwork. Patient states that she has had blisters of her feet.  She was seeing [facility] [facility] for this.  She states that she has has had blisters since 2014 to around 2015 to 2016  She states that she did see Dr. Cade for this who was her primary care at the time.  She states that she saw wound care in [place] also before [place] for about 6 months. She states that the sores will come and go.  She states that she has seen vascular in the past and was told that she would need surgery. She does wear compression stockings to keep swelling down.  She did see [facility] in the past.  She is not sure of a diagnosis.      Review of systems:  See above.   Denies any symptoms unless previously indicated.        Current Outpatient Medications:   •  ROPINIRole (REQUIP) 0.5 MG Tab, Take 1 Tab by mouth every day., Disp: 30 Tab, Rfl: 6  •  cetirizine (ZYRTEC) 10 MG Tab, Take 1 Tab by mouth every day., Disp: 30 Tab, Rfl: 3  •  fluticasone (FLONASE) 50 MCG/ACT nasal spray, Spray 2 Sprays in nose every day., Disp: 16 g, Rfl: 6  •  FEROSUL 325 (65 Fe) MG tablet, Take 1 Tab by mouth every day., Disp: 90 Tab, Rfl: 3  •  Magnesium Oxide 500 MG Tab, Take 1 Tab by mouth every day., Disp: 90 Tab, Rfl: 3  •  oxybutynin SR (DITROPAN-XL) 10 MG CR tablet, Take 1 Tab by mouth every day. Take 1 tablet by mouth every day, Disp: 90 Tab, Rfl: 2  •  albuterol 108 (90 Base) MCG/ACT Aero Soln inhalation aerosol, Inhale 2-4 Puffs by mouth every 8 hours as needed. For SOB., Disp: 8.5 g, Rfl: 3  •  fexofenadine (ALLEGRA) 180 MG tablet, Take 1 Tab by mouth every day., Disp: 90 Tab, Rfl: 1  •  montelukast (SINGULAIR) 10 MG Tab, Take 1 Tab by mouth every day. Take 1 tablet by mouth every morning, Disp: 90 Tab, Rfl: 2  •  Mometasone Furo-Formoterol Fum (DULERA) 100-5 MCG/ACT Aerosol, Inhale 2  "Puffs by mouth 2 Times a Day., Disp: 3 g, Rfl: 3  •  Rizatriptan Benzoate (MAXALT-MLT) 5 MG TABLET DISPERSIBLE, Take 1 Tab by mouth 3 times a week., Disp: 10 Tab, Rfl: 3  •  aspirin EC (ECOTRIN) 81 MG Tablet Delayed Response, Take 1 Tab by mouth every day., Disp: 90 Tab, Rfl: 3  •  naproxen (NAPROSYN) 500 MG Tab, Take 1 Tab by mouth 2 times a day, with meals., Disp: 180 Tab, Rfl: 2  •  Riboflavin 400 MG Cap, Take 1 Cap by mouth every day., Disp: 90 Cap, Rfl: 3  •  polyethylene glycol/lytes (MIRALAX) 17 g Pack, Take 1 Packet by mouth every day., Disp: 90 Each, Rfl: 3  •  potassium chloride SA (K-DUR) 10 MEQ Tab CR, Take 1 Tab by mouth every day., Disp: 90 Tab, Rfl: 2  •  nortriptyline (PAMELOR) 25 MG Cap, TAKE 1 CAPSULE BY MOUTH BEFORE AT BEDTIME FOR 7 DAYS THEN INCREASE TO 2 CAPSULES BY MOUTH AT BEDTIME, Disp: 90 Cap, Rfl: 3  •  Coenzyme Q10 300 MG Cap, Take 1 Cap by mouth every day., Disp: 90 Cap, Rfl: 3  •  Riboflavin 400 MG Tab, Take 1 tablet by mouth every day. Take 1 tablet by mouth every day, Disp: , Rfl:     Allergies, past medical history, past surgical history, family history, social history reviewed and updated    Objective:     Vitals: /80 (BP Location: Left arm, Patient Position: Sitting, BP Cuff Size: Adult)   Pulse 96   Temp 37.9 °C (100.2 °F) (Temporal)   Resp 16   Ht 1.651 m (5' 5\")   Wt 73 kg (161 lb)   LMP 01/01/2017 (Within Weeks)   SpO2 96%   Breastfeeding No   BMI 26.79 kg/m²   General: Alert, pleasant, NAD  EYES:   PERRL, EOMI, no icterus or pallor.  Conjunctivae and lids normal.   HENT:  Normocephalic.  External ears normal.   Neck supple.    Respiratory: Normal respiratory effort.   Abdomen: Not obese.  Skin: Warm, dry, no rashes.  Musculoskeletal: Gait is normal.  Moves all extremities well.    Extremities: moderate varicosities lower extremities and feet bilaterally.   Neurological: No tremors, sensation grossly intact, CN2-12 intact.  Psych:  Affect/mood is normal, judgement " is good, memory is intact, grooming is appropriate.    Assessment/Plan:     Virgen was seen today for paperwork.    Diagnoses and all orders for this visit:    Varicose veins of both lower extremities with pain      Disability paperwork is been filled up at this time.  We will try to obtain records from his previous disability.  In the meantime we will refer back to [facility] vein clinic as patient does have some concern.  We will follow-up as we need to on this issue.  She will contact us sooner if needed.      No follow-ups on file.    Please note that this dictation was created using voice recognition software. I have made every reasonable attempt to correct obvious errors, but expect that there are errors of grammar and possible content that I did not discover before finalizing note.

## 2020-09-30 NOTE — LETTER
[facility]  Adamaris Hamilton P.A.-C.  [address] [address] [address]  [facility] [address]  Fax: [phone]   Authorization for Release/Disclosure of   Protected Health Information   Name: VIRGEN SANFORD : 1975 SSN: [id number]   Address: [address] [place] [address]  [facility] [address] Phone:    [phone] (home)    I authorize the entity listed below to release/disclose the PHI below to:   [facility]/Adamaris Hamilton P.A.-C. and Adamaris Hamilton P.A.-C.   Provider or Entity Name: [facility]     Address   City, State, Zip   Phone:      Fax:     Reason for request: continuity of care   Information to be released:    [  ] LAST COLONOSCOPY,  including any PATH REPORT and follow-up  [  ] LAST FIT/COLOGUARD RESULT [  ] LAST DEXA  [  ] LAST MAMMOGRAM  [  ] LAST PAP  [  ] LAST LABS [  ] RETINA EXAM REPORT  [  ] IMMUNIZATION RECORDS  [ x ] Release all info      [  ] Check here and initial the line next to each item to release ALL health information INCLUDING  _____ Care and treatment for drug and / or alcohol abuse  _____ HIV testing, infection status, or AIDS  _____ Genetic Testing    DATES OF SERVICE OR TIME PERIOD TO BE DISCLOSED: _____________  I understand and acknowledge that:  * This Authorization may be revoked at any time by you in writing, except if your health information has already been used or disclosed.  * Your health information that will be used or disclosed as a result of you signing this authorization could be re-disclosed by the recipient. If this occurs, your re-disclosed health information may no longer be protected by State or Federal laws.  * You may refuse to sign this Authorization. Your refusal will not affect your ability to obtain treatment.  * This Authorization becomes effective upon signing and will  on (date) __________.      If no date is indicated, this Authorization will  one (1) year from the signature date.    Name: Virgen Sanford    Signature:   Date:          9/30/2020       PLEASE FAX REQUESTED RECORDS BACK TO: [phone]

## 2020-09-30 NOTE — LETTER
[facility]  Adamaris Hamilton P.A.-C.  [address] [address] [address]  [facility] [address]  Fax: [phone]   Authorization for Release/Disclosure of   Protected Health Information   Name: VIRGEN SANFORD : 1975 SSN: [id number]   Address: [address] [place] [address]  [facility] [address] Phone:    [phone] (home)    I authorize the entity listed below to release/disclose the PHI below to:   [facility]/Adamaris Hamilton P.A.-C. and Adamaris Hamilton P.A.-C.   Provider or Entity Name:  [facility] wound care     Address   City, State, [facility]   Phone:      Fax:     Reason for request: continuity of care   Information to be released:    [  ] LAST COLONOSCOPY,  including any PATH REPORT and follow-up  [  ] LAST FIT/COLOGUARD RESULT [  ] LAST DEXA  [  ] LAST MAMMOGRAM  [  ] LAST PAP  [  ] LAST LABS [  ] RETINA EXAM REPORT  [  ] IMMUNIZATION RECORDS  [x  ] Release all info      [  ] Check here and initial the line next to each item to release ALL health information INCLUDING  _____ Care and treatment for drug and / or alcohol abuse  _____ HIV testing, infection status, or AIDS  _____ Genetic Testing    DATES OF SERVICE OR TIME PERIOD TO BE DISCLOSED: _____________  I understand and acknowledge that:  * This Authorization may be revoked at any time by you in writing, except if your health information has already been used or disclosed.  * Your health information that will be used or disclosed as a result of you signing this authorization could be re-disclosed by the recipient. If this occurs, your re-disclosed health information may no longer be protected by State or Federal laws.  * You may refuse to sign this Authorization. Your refusal will not affect your ability to obtain treatment.  * This Authorization becomes effective upon signing and will  on (date) __________.      If no date is indicated, this Authorization will  one (1) year from the signature date.    Name: Virgen Sanford    Signature:   Date:     9/30/2020       PLEASE FAX REQUESTED RECORDS BACK TO: [phone]

## 2020-12-10 RX ORDER — NORTRIPTYLINE HYDROCHLORIDE 25 MG/1
CAPSULE ORAL
Qty: 90 CAP | Refills: 0 | Status: SHIPPED | OUTPATIENT
Start: 2020-12-10 | End: 2021-02-01 | Stop reason: SDUPTHER

## 2020-12-11 NOTE — TELEPHONE ENCOUNTER
Was the patient seen in the last year in this department? Yes    Does patient have an active prescription for medications requested? Yes    Received Request Via: Pharmacy    No visits with results within 1 Year(s) from this visit.   Latest known visit with results is:   Office Visit on 07/07/2015   Component Date Value   • POC Color 07/07/2015 dark yellow    • POC Appearance 07/07/2015 clear    • POC Leukocyte Esterase 07/07/2015 neg    • POC Nitrites 07/07/2015 neg    • POC Urobiligen 07/07/2015 neg    • POC Protein 07/07/2015 neg    • POC Urine PH 07/07/2015 5.0    • POC Blood 07/07/2015 neg    • POC Specific Gravity 07/07/2015 1.030    • POC Ketones 07/07/2015 15    • POC Bilirubin 07/07/2015 neg    • POC Glucose 07/07/2015 neg    • Rapid Strep Screen 07/07/2015 neg    • Internal Control Positive 07/07/2015 Positive    • Internal Control Negative 07/07/2015 Negative    ]

## 2020-12-14 DIAGNOSIS — J45.40 MODERATE PERSISTENT ASTHMA WITHOUT COMPLICATION: ICD-10-CM

## 2020-12-14 DIAGNOSIS — G25.81 RESTLESS LEG: ICD-10-CM

## 2020-12-14 DIAGNOSIS — E87.6 HYPOKALEMIA: ICD-10-CM

## 2020-12-14 DIAGNOSIS — J30.2 SEASONAL ALLERGIES: ICD-10-CM

## 2020-12-14 DIAGNOSIS — N30.90 CYSTITIS: ICD-10-CM

## 2020-12-14 DIAGNOSIS — G43.119 INTRACTABLE MIGRAINE WITH AURA WITHOUT STATUS MIGRAINOSUS: ICD-10-CM

## 2020-12-14 DIAGNOSIS — R32 URINARY INCONTINENCE, UNSPECIFIED TYPE: ICD-10-CM

## 2020-12-14 RX ORDER — POTASSIUM CHLORIDE 750 MG/1
10 TABLET, EXTENDED RELEASE ORAL DAILY
Qty: 90 TAB | Refills: 0 | Status: SHIPPED | OUTPATIENT
Start: 2020-12-14 | End: 2021-03-12 | Stop reason: SDUPTHER

## 2020-12-14 RX ORDER — ROPINIROLE 0.5 MG/1
0.5 TABLET, FILM COATED ORAL DAILY
Qty: 30 TAB | Refills: 0 | Status: SHIPPED | OUTPATIENT
Start: 2020-12-14 | End: 2021-01-18 | Stop reason: SDUPTHER

## 2020-12-14 RX ORDER — ALBUTEROL SULFATE 90 UG/1
2-4 AEROSOL, METERED RESPIRATORY (INHALATION) EVERY 8 HOURS PRN
Qty: 8.5 G | Refills: 0 | Status: SHIPPED | OUTPATIENT
Start: 2020-12-14

## 2020-12-14 RX ORDER — OXYBUTYNIN CHLORIDE 10 MG/1
10 TABLET, EXTENDED RELEASE ORAL DAILY
Qty: 90 TAB | Refills: 0 | Status: SHIPPED | OUTPATIENT
Start: 2020-12-14 | End: 2021-03-12 | Stop reason: SDUPTHER

## 2020-12-14 RX ORDER — FLUTICASONE PROPIONATE 50 MCG
2 SPRAY, SUSPENSION (ML) NASAL DAILY
Qty: 16 G | Refills: 0 | Status: SHIPPED | OUTPATIENT
Start: 2020-12-14 | End: 2021-01-18 | Stop reason: SDUPTHER

## 2020-12-14 RX ORDER — NAPROXEN 500 MG/1
500 TABLET ORAL 2 TIMES DAILY WITH MEALS
Qty: 180 TAB | Refills: 0 | Status: SHIPPED | OUTPATIENT
Start: 2020-12-14 | End: 2021-03-12 | Stop reason: SDUPTHER

## 2020-12-14 RX ORDER — RIZATRIPTAN BENZOATE 5 MG/1
1 TABLET, ORALLY DISINTEGRATING ORAL
Qty: 10 TAB | Refills: 0 | Status: SHIPPED | OUTPATIENT
Start: 2020-12-14

## 2020-12-30 DIAGNOSIS — J45.40 MODERATE PERSISTENT ASTHMA WITHOUT COMPLICATION: ICD-10-CM

## 2020-12-30 NOTE — TELEPHONE ENCOUNTER
Was the patient seen in the last year in this department? Yes    Does patient have an active prescription for medications requested? Yes    Received Request Via: Patient    No visits with results within 1 Year(s) from this visit.   Latest known visit with results is:   Office Visit on 07/07/2015   Component Date Value   • POC Color 07/07/2015 dark yellow    • POC Appearance 07/07/2015 clear    • POC Leukocyte Esterase 07/07/2015 neg    • POC Nitrites 07/07/2015 neg    • POC Urobiligen 07/07/2015 neg    • POC Protein 07/07/2015 neg    • POC Urine PH 07/07/2015 5.0    • POC Blood 07/07/2015 neg    • POC Specific Gravity 07/07/2015 1.030    • POC Ketones 07/07/2015 15    • POC Bilirubin 07/07/2015 neg    • POC Glucose 07/07/2015 neg    • Rapid Strep Screen 07/07/2015 neg    • Internal Control Positive 07/07/2015 Positive    • Internal Control Negative 07/07/2015 Negative    ]

## 2021-01-18 DIAGNOSIS — J30.2 SEASONAL ALLERGIES: ICD-10-CM

## 2021-01-18 DIAGNOSIS — G25.81 RESTLESS LEG: ICD-10-CM

## 2021-01-18 RX ORDER — FLUTICASONE PROPIONATE 50 MCG
2 SPRAY, SUSPENSION (ML) NASAL DAILY
Qty: 16 G | Refills: 0 | Status: SHIPPED | OUTPATIENT
Start: 2021-01-18

## 2021-01-18 RX ORDER — ROPINIROLE 0.5 MG/1
0.5 TABLET, FILM COATED ORAL DAILY
Qty: 90 TAB | Refills: 0 | Status: SHIPPED | OUTPATIENT
Start: 2021-01-18

## 2021-02-01 RX ORDER — NORTRIPTYLINE HYDROCHLORIDE 25 MG/1
CAPSULE ORAL
Qty: 90 CAP | Refills: 0 | Status: SHIPPED | OUTPATIENT
Start: 2021-02-01

## 2021-03-12 DIAGNOSIS — R32 URINARY INCONTINENCE, UNSPECIFIED TYPE: ICD-10-CM

## 2021-03-12 DIAGNOSIS — G25.81 RESTLESS LEG: ICD-10-CM

## 2021-03-12 DIAGNOSIS — E87.6 HYPOKALEMIA: ICD-10-CM

## 2021-03-12 DIAGNOSIS — N30.90 CYSTITIS: ICD-10-CM

## 2021-03-15 RX ORDER — OXYBUTYNIN CHLORIDE 10 MG/1
10 TABLET, EXTENDED RELEASE ORAL DAILY
Qty: 30 TABLET | Refills: 0 | Status: SHIPPED | OUTPATIENT
Start: 2021-03-15

## 2021-03-15 RX ORDER — POTASSIUM CHLORIDE 750 MG/1
10 TABLET, EXTENDED RELEASE ORAL DAILY
Qty: 30 TABLET | Refills: 0 | Status: SHIPPED | OUTPATIENT
Start: 2021-03-15

## 2021-03-15 RX ORDER — NAPROXEN 500 MG/1
500 TABLET ORAL 2 TIMES DAILY WITH MEALS
Qty: 60 TABLET | Refills: 0 | Status: SHIPPED | OUTPATIENT
Start: 2021-03-15

## 2022-04-11 NOTE — TELEPHONE ENCOUNTER
Needs to be seen for further refills.  
Received request via: Pharmacy    Was the patient seen in the last year in this department? Yes    Does the patient have an active prescription (recently filled or refills available) for medication(s) requested? No  
ambulatory